# Patient Record
Sex: FEMALE | Race: WHITE | NOT HISPANIC OR LATINO
[De-identification: names, ages, dates, MRNs, and addresses within clinical notes are randomized per-mention and may not be internally consistent; named-entity substitution may affect disease eponyms.]

---

## 2021-04-06 PROBLEM — Z00.00 ENCOUNTER FOR PREVENTIVE HEALTH EXAMINATION: Status: ACTIVE | Noted: 2021-04-06

## 2021-05-13 ENCOUNTER — APPOINTMENT (OUTPATIENT)
Dept: PLASTIC SURGERY | Facility: CLINIC | Age: 21
End: 2021-05-13
Payer: MEDICAID

## 2021-05-13 VITALS
HEIGHT: 67 IN | DIASTOLIC BLOOD PRESSURE: 71 MMHG | BODY MASS INDEX: 22.76 KG/M2 | WEIGHT: 145 LBS | OXYGEN SATURATION: 98 % | SYSTOLIC BLOOD PRESSURE: 110 MMHG | TEMPERATURE: 97.6 F | HEART RATE: 71 BPM

## 2021-05-13 PROCEDURE — 99203 OFFICE O/P NEW LOW 30 MIN: CPT

## 2021-05-13 PROCEDURE — 99072 ADDL SUPL MATRL&STAF TM PHE: CPT

## 2021-06-03 NOTE — PHYSICAL EXAM
[NI] : Normal [de-identified] : Please see scanned in breast sheet\par SN-N: L - 28 1/2, R - 26 1/2\par N-IMF: L - 15 1/2, R - 14\par BW: 11\par grade 3 ptosis bilaterally\par L >> R asymmetry\par

## 2021-06-03 NOTE — HISTORY OF PRESENT ILLNESS
[FreeTextEntry1] : Ms. TALHA COULTER is a 20 year  yo female who presents with bilateral macromastia complaining of bilateral shoulder, neck, and back pain for many years.  She states that she has tried an array of supportive bras, including expensive specialty bras, and none of these have helped her.  She has tried Tylenol and ibuprofen which have not provided any relief.  She complains of repetitive bouts of rashes under her breasts and between her breasts which she treats with a variety of topical medications but the rashes keep coming back.  She complains that her large breasts cause her constant daily pain and affect her activities of daily life including making running, exercising, and many other activities very painful.  She wears a DD bra.  She has no family history of early breast cancer.  She is a non-smoker.  She has no previous history of any breast problems or breast surgeries.  She has no history of traumatic injuries or back surgery that could explain her complaints.  \par \par

## 2021-06-03 NOTE — ASSESSMENT
[FreeTextEntry1] : 20 year old female with intractable back, neck, and bilateral shoulder pain, secondary to large ptotic breasts that interfere with the patient’s activities of normal daily living.  I feel that she is a good candidate for a breast reduction.  \par \par The patient was counseled on the risks, benefits and alternatives of bilateral breast reduction including the risks of infection, bleeding, seroma, hematoma, fat necrosis, decreased/increased /loss of nipple sensation, nipple necrosis, skin flap necrosis, fat necrosis, dehiscence, asymmetry.  The patient understands the risks, all questions were answered and the patient gave informed consent.  The patient wishes to proceed with surgery and will be scheduled for bilateral breast reduction.\par \par

## 2021-08-26 RX ORDER — OXYCODONE 5 MG/1
5 TABLET ORAL
Qty: 10 | Refills: 0 | Status: ACTIVE | COMMUNITY
Start: 2021-08-26 | End: 1900-01-01

## 2021-08-27 DIAGNOSIS — Z01.818 ENCOUNTER FOR OTHER PREPROCEDURAL EXAMINATION: ICD-10-CM

## 2021-08-28 ENCOUNTER — APPOINTMENT (OUTPATIENT)
Dept: DISASTER EMERGENCY | Facility: CLINIC | Age: 21
End: 2021-08-28

## 2021-08-30 ENCOUNTER — APPOINTMENT (OUTPATIENT)
Dept: PLASTIC SURGERY | Facility: AMBULATORY SURGERY CENTER | Age: 21
End: 2021-08-30
Payer: MEDICAID

## 2021-08-30 ENCOUNTER — RESULT REVIEW (OUTPATIENT)
Age: 21
End: 2021-08-30

## 2021-08-30 LAB — SARS-COV-2 N GENE NPH QL NAA+PROBE: NOT DETECTED

## 2021-08-30 PROCEDURE — 19318 BREAST REDUCTION: CPT | Mod: 50

## 2021-09-01 RX ORDER — OXYCODONE 5 MG/1
5 TABLET ORAL
Qty: 10 | Refills: 0 | Status: ACTIVE | COMMUNITY
Start: 2021-09-01 | End: 1900-01-01

## 2021-09-02 ENCOUNTER — APPOINTMENT (OUTPATIENT)
Dept: PLASTIC SURGERY | Facility: CLINIC | Age: 21
End: 2021-09-02
Payer: MEDICAID

## 2021-09-02 DIAGNOSIS — N62 HYPERTROPHY OF BREAST: ICD-10-CM

## 2021-09-02 PROCEDURE — 99024 POSTOP FOLLOW-UP VISIT: CPT

## 2021-09-02 RX ORDER — HYDROCODONE BITARTRATE AND ACETAMINOPHEN 7.5; 325 MG/1; MG/1
7.5-325 TABLET ORAL
Qty: 12 | Refills: 0 | Status: ACTIVE | COMMUNITY
Start: 2021-05-04

## 2021-09-02 RX ORDER — FLUCONAZOLE 150 MG/1
150 TABLET ORAL
Qty: 3 | Refills: 0 | Status: ACTIVE | COMMUNITY
Start: 2021-08-10

## 2021-09-02 RX ORDER — IBUPROFEN 800 MG/1
800 TABLET, FILM COATED ORAL
Qty: 18 | Refills: 0 | Status: ACTIVE | COMMUNITY
Start: 2021-05-04

## 2021-09-02 RX ORDER — AMOXICILLIN 500 MG/1
500 CAPSULE ORAL
Qty: 15 | Refills: 0 | Status: ACTIVE | COMMUNITY
Start: 2021-05-04

## 2021-09-02 RX ORDER — CITALOPRAM HYDROBROMIDE 40 MG/1
40 TABLET, FILM COATED ORAL
Qty: 90 | Refills: 0 | Status: ACTIVE | COMMUNITY
Start: 2021-05-06

## 2021-09-02 NOTE — ASSESSMENT
[FreeTextEntry1] : 22 yo female s/p b/l breast reduction 8/30/21\par pt doing well\par pt seen and examined with Dr. Sotelo\par bilateral drains removed today without difficulty, pt tolerated procedure well\par monitor for redness, swelling, fever, chills\par no heavy lifting or strenuous activity\par continue to wear soft, non wire bra\par all pt questions answered\par

## 2021-09-02 NOTE — PHYSICAL EXAM
[NI] : Normal [de-identified] : b/l breast are soft and symmetrical\par nipples viable\par drains in place and functioning \par no palpable fluid collections or signs of infection

## 2021-09-02 NOTE — HISTORY OF PRESENT ILLNESS
[FreeTextEntry1] : Ms. TALHA COULTER is a 20 year  yo female who presents with bilateral macromastia complaining of bilateral shoulder, neck, and back pain for many years. Pt is now s/p b/l breast reduction 8/30/21 with relief of neck, back, and shoulder pain.\par \par Pt has been having a lot of pain coming from her drain sites\par Drains \par R - 30\par L - 35\par Ana fever, chills, LE pain/edema\par

## 2021-09-07 ENCOUNTER — APPOINTMENT (OUTPATIENT)
Dept: PLASTIC SURGERY | Facility: CLINIC | Age: 21
End: 2021-09-07

## 2021-09-14 ENCOUNTER — APPOINTMENT (OUTPATIENT)
Dept: PLASTIC SURGERY | Facility: CLINIC | Age: 21
End: 2021-09-14
Payer: MEDICAID

## 2021-09-14 VITALS — HEART RATE: 93 BPM | SYSTOLIC BLOOD PRESSURE: 105 MMHG | OXYGEN SATURATION: 96 % | DIASTOLIC BLOOD PRESSURE: 67 MMHG

## 2021-09-14 PROCEDURE — 99024 POSTOP FOLLOW-UP VISIT: CPT

## 2021-09-14 NOTE — HISTORY OF PRESENT ILLNESS
[FreeTextEntry1] : Ms. TALHA COULTER is a 20 year  yo female who presents with bilateral macromastia complaining of bilateral shoulder, neck, and back pain for many years. Pt is now s/p b/l breast reduction 8/30/21 with relief of neck, back, and shoulder pain.\par Ana fever, chills, LE pain/edema\par pt has been having some drainage from her right breast incision\par \par

## 2021-09-14 NOTE — PHYSICAL EXAM
[NI] : Normal [de-identified] : b/l breast are soft and symmetrical\par nipples viable\par there is a small T point dehiscence on the right breast, minimal fibrin, no surrounding signs of infection\par no palpable fluid collections or signs of infection

## 2021-09-14 NOTE — ASSESSMENT
[FreeTextEntry1] : 22 yo female s/p b/l brm 8/30/21\par pt doing well\par t point sutures removed today without difficulty\par discussed applying gauze to right T point dehiscence daily after showers\par monitor for redness, swelling, fever, chills\par no heavy lifting or strenuous activity\par continue to wear soft, non wire bra\par all pt questions answered\par

## 2021-10-05 ENCOUNTER — APPOINTMENT (OUTPATIENT)
Dept: PLASTIC SURGERY | Facility: CLINIC | Age: 21
End: 2021-10-05
Payer: MEDICAID

## 2021-10-05 VITALS
RESPIRATION RATE: 14 BRPM | WEIGHT: 141 LBS | OXYGEN SATURATION: 98 % | HEART RATE: 16 BPM | SYSTOLIC BLOOD PRESSURE: 102 MMHG | TEMPERATURE: 97.2 F | HEIGHT: 67 IN | BODY MASS INDEX: 22.13 KG/M2 | DIASTOLIC BLOOD PRESSURE: 66 MMHG

## 2021-10-05 DIAGNOSIS — Z98.890 OTHER SPECIFIED POSTPROCEDURAL STATES: ICD-10-CM

## 2021-10-05 PROCEDURE — 99024 POSTOP FOLLOW-UP VISIT: CPT

## 2021-10-05 NOTE — PHYSICAL EXAM
[NI] : Normal [de-identified] : b/l breast are soft and symmetrical\par nipples viable\par no palpable fluid collections or signs of infection\par there is tight scarring/chording under the left breast, tender to palpation

## 2021-10-05 NOTE — HISTORY OF PRESENT ILLNESS
[FreeTextEntry1] : Ms. TALHA COULTER is a 20 year  yo female who presents with bilateral macromastia complaining of bilateral shoulder, neck, and back pain for many years. Pt is now s/p b/l breast reduction 8/30/21 with relief of neck, back, and shoulder pain.\par Ana fever, chills, LE pain/edema\par pt noticed some tight scarring on her left breast area\par \par

## 2021-10-05 NOTE — ASSESSMENT
[FreeTextEntry1] : 22 yo female s/p b/l BRM 8/30/21\par pt doing well\par pt seen and examined with Dr. Sotelo today, discussed getting an ultrasound of the area \par no restrictions\par all questions answered

## 2021-10-12 ENCOUNTER — APPOINTMENT (OUTPATIENT)
Dept: ULTRASOUND IMAGING | Facility: CLINIC | Age: 21
End: 2021-10-12

## 2021-11-23 ENCOUNTER — APPOINTMENT (OUTPATIENT)
Dept: PLASTIC SURGERY | Facility: CLINIC | Age: 21
End: 2021-11-23

## 2023-05-14 ENCOUNTER — EMERGENCY (EMERGENCY)
Facility: HOSPITAL | Age: 23
LOS: 1 days | Discharge: HOME | End: 2023-05-14
Attending: STUDENT IN AN ORGANIZED HEALTH CARE EDUCATION/TRAINING PROGRAM
Payer: MEDICAID

## 2023-05-14 VITALS
RESPIRATION RATE: 18 BRPM | DIASTOLIC BLOOD PRESSURE: 56 MMHG | WEIGHT: 164.91 LBS | SYSTOLIC BLOOD PRESSURE: 115 MMHG | HEART RATE: 83 BPM | TEMPERATURE: 98 F | OXYGEN SATURATION: 98 % | HEIGHT: 67 IN

## 2023-05-14 VITALS
RESPIRATION RATE: 19 BRPM | DIASTOLIC BLOOD PRESSURE: 67 MMHG | HEART RATE: 75 BPM | OXYGEN SATURATION: 98 % | TEMPERATURE: 98 F | SYSTOLIC BLOOD PRESSURE: 98 MMHG

## 2023-05-14 PROCEDURE — 99283 EMERGENCY DEPT VISIT LOW MDM: CPT

## 2023-05-14 PROCEDURE — 99284 EMERGENCY DEPT VISIT MOD MDM: CPT

## 2023-05-14 RX ORDER — LIDOCAINE 4 G/100G
1 CREAM TOPICAL ONCE
Refills: 0 | Status: COMPLETED | OUTPATIENT
Start: 2023-05-14 | End: 2023-05-14

## 2023-05-14 RX ORDER — DIAZEPAM 5 MG
5 TABLET ORAL ONCE
Refills: 0 | Status: DISCONTINUED | OUTPATIENT
Start: 2023-05-14 | End: 2023-05-14

## 2023-05-14 RX ORDER — IBUPROFEN 200 MG
600 TABLET ORAL ONCE
Refills: 0 | Status: COMPLETED | OUTPATIENT
Start: 2023-05-14 | End: 2023-05-14

## 2023-05-14 RX ORDER — ACETAMINOPHEN 500 MG
975 TABLET ORAL ONCE
Refills: 0 | Status: COMPLETED | OUTPATIENT
Start: 2023-05-14 | End: 2023-05-14

## 2023-05-14 RX ADMIN — Medication 5 MILLIGRAM(S): at 12:44

## 2023-05-14 RX ADMIN — Medication 975 MILLIGRAM(S): at 12:44

## 2023-05-14 RX ADMIN — Medication 600 MILLIGRAM(S): at 12:48

## 2023-05-14 RX ADMIN — LIDOCAINE 1 PATCH: 4 CREAM TOPICAL at 12:44
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
## 2023-05-14 NOTE — ED PROVIDER NOTE - NSFOLLOWUPCLINICS_GEN_ALL_ED_FT
[facility] Orthopedic Surgery  Orthopedic Surgery  [address], [address]rd & [address]th [place] [address]  Phone: [phone]  Fax:   Follow Up Time: 1-3 Days

## 2023-05-14 NOTE — ED PROVIDER NOTE - ATTENDING APP SHARED VISIT CONTRIBUTION OF CARE
I, Darinel Lopez, performed a history and physical exam of the patient and discussed their management with the resident and/or advanced care provider. I reviewed the resident and/or advanced care provider's note and agree with the documented findings and plan of care except where noted. I was present and available for all procedures.    see mdm

## 2023-05-14 NOTE — ED PROVIDER NOTE - NSFOLLOWUPINSTRUCTIONS_ED_ALL_ED_FT
you can take Tylenol or Motrin as needed for your symptoms    you can also apply a lidocaine patch to the area as well. do not keep the patch on for more than 12 hours at a time. when removing the patch, do not reapply a new patch for another 12 hours.    Back Pain    Back pain is very common in adults. The cause of back pain is rarely dangerous and the pain often gets better over time. The cause of your back pain may not be known and may include strain of muscles or ligaments, degeneration of the spinal disks, or arthritis. Occasionally the pain may radiate down your leg(s). Over-the-counter medicines to reduce pain and inflammation are often the most helpful. Stretching and remaining active frequently helps the healing process.     SEEK IMMEDIATE MEDICAL CARE IF YOU HAVE ANY OF THE FOLLOWING SYMPTOMS: bowel or bladder control problems, unusual weakness or numbness in your arms or legs, nausea or vomiting, abdominal pain, fever, dizziness/lightheadedness.    [facility] Orthopedic Surgery  Orthopedic Surgery  [address], [address]rd & [address] [place] [address]  Phone: [phone]  Fax:   Follow Up Time: 1-3 Days

## 2023-05-14 NOTE — ED PROVIDER NOTE - MUSCULOSKELETAL, MLM
Spine appears normal, range of motion is not limited, no muscle or joint tenderness. strength 5/5 both upper and lower extremities.

## 2023-05-14 NOTE — ED PROVIDER NOTE - PATIENT PORTAL LINK FT
You can access the FollowMyHealth Patient Portal offered by [facility] by registering at the following website: [email or web address]. By joining [employer]’s FollowMyHealth portal, you will also be able to view your health information using other applications (apps) compatible with our system.

## 2023-05-14 NOTE — ED ADULT TRIAGE NOTE - CHIEF COMPLAINT QUOTE
lower back pain x 2+ weeks radiating down left hip + leg, left lower extremity numbness x 4 days, no urinary/ stool incontinence. scheduled for MRI in 3 days, pt states "I can't wait that long"

## 2023-05-14 NOTE — ED PROVIDER NOTE - OBJECTIVE STATEMENT
21 y/o female, denies pmh, presents to the ED with complaint of atraumatic lower back pain x two weeks. states pain located to lumbar region and radiates down left leg. states has been constant. has tried Tylenol and motrin with no relief. states saw her pcp is scheduled for an MRI in three days. states she is able to ambulate. states at times experiences tingling down her left leg as well as numbness. states has been intermittent. denies fall or trauma. denies f/n/v/d, CP, SOB, HA, dizziness, urinary symptoms, denies incontinence, denies leg pain/swelling.
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
